# Patient Record
Sex: MALE | Race: WHITE
[De-identification: names, ages, dates, MRNs, and addresses within clinical notes are randomized per-mention and may not be internally consistent; named-entity substitution may affect disease eponyms.]

---

## 2020-02-14 ENCOUNTER — HOSPITAL ENCOUNTER (OUTPATIENT)
Dept: HOSPITAL 76 - LAB.WCP | Age: 85
Discharge: HOME | End: 2020-02-14
Attending: FAMILY MEDICINE
Payer: MEDICARE

## 2020-02-14 DIAGNOSIS — M10.00: Primary | ICD-10-CM

## 2020-02-14 LAB
ANION GAP SERPL CALCULATED.4IONS-SCNC: 9 MMOL/L (ref 6–13)
BASOPHILS NFR BLD AUTO: 0.1 10^3/UL (ref 0–0.1)
BASOPHILS NFR BLD AUTO: 0.6 %
BUN SERPL-MCNC: 24 MG/DL (ref 6–20)
CALCIUM UR-MCNC: 10 MG/DL (ref 8.5–10.3)
CHLORIDE SERPL-SCNC: 104 MMOL/L (ref 101–111)
CO2 SERPL-SCNC: 26 MMOL/L (ref 21–32)
CREAT SERPLBLD-SCNC: 1.3 MG/DL (ref 0.6–1.2)
CRP SERPL-MCNC: < 1 MG/DL (ref 0–1)
EOSINOPHIL # BLD AUTO: 0.4 10^3/UL (ref 0–0.7)
EOSINOPHIL NFR BLD AUTO: 4.8 %
ERYTHROCYTE [DISTWIDTH] IN BLOOD BY AUTOMATED COUNT: 14.4 % (ref 12–15)
GFRSERPLBLD MDRD-ARVRAT: 52 ML/MIN/{1.73_M2} (ref 89–?)
GLUCOSE SERPL-MCNC: 98 MG/DL (ref 70–100)
HGB UR QL STRIP: 16.1 G/DL (ref 14–18)
LYMPHOCYTES # SPEC AUTO: 1.2 10^3/UL (ref 1.5–3.5)
LYMPHOCYTES NFR BLD AUTO: 15.4 %
MCH RBC QN AUTO: 30.7 PG (ref 27–31)
MCHC RBC AUTO-ENTMCNC: 33 G/DL (ref 32–36)
MCV RBC AUTO: 93.1 FL (ref 80–94)
MONOCYTES # BLD AUTO: 0.8 10^3/UL (ref 0–1)
MONOCYTES NFR BLD AUTO: 10.9 %
NEUTROPHILS # BLD AUTO: 5.2 10^3/UL (ref 1.5–6.6)
NEUTROPHILS # SNV AUTO: 7.7 X10^3/UL (ref 4.8–10.8)
NEUTROPHILS NFR BLD AUTO: 67.8 %
PDW BLD AUTO: 10 FL (ref 7.4–11.4)
PLATELET # BLD: 324 10^3/UL (ref 130–450)
RBC MAR: 5.24 10^6/UL (ref 4.7–6.1)
SODIUM SERPLBLD-SCNC: 139 MMOL/L (ref 135–145)
URATE SERPL-MCNC: 8.2 MG/DL (ref 2.6–7.2)

## 2020-02-14 PROCEDURE — 86140 C-REACTIVE PROTEIN: CPT

## 2020-02-14 PROCEDURE — 84550 ASSAY OF BLOOD/URIC ACID: CPT

## 2020-02-14 PROCEDURE — 36415 COLL VENOUS BLD VENIPUNCTURE: CPT

## 2020-02-14 PROCEDURE — 85025 COMPLETE CBC W/AUTO DIFF WBC: CPT

## 2020-02-14 PROCEDURE — 85651 RBC SED RATE NONAUTOMATED: CPT

## 2020-02-14 PROCEDURE — 80048 BASIC METABOLIC PNL TOTAL CA: CPT

## 2020-03-17 ENCOUNTER — HOSPITAL ENCOUNTER (OUTPATIENT)
Dept: HOSPITAL 76 - EMS | Age: 85
Discharge: HOME | End: 2020-03-17
Attending: SURGERY
Payer: MEDICARE

## 2020-03-17 DIAGNOSIS — Z53.9: Primary | ICD-10-CM

## 2020-10-12 ENCOUNTER — HOSPITAL ENCOUNTER (OUTPATIENT)
Dept: HOSPITAL 76 - LAB.R | Age: 85
Discharge: HOME | End: 2020-10-12
Attending: PHYSICIAN ASSISTANT
Payer: MEDICARE

## 2020-10-12 DIAGNOSIS — R35.0: Primary | ICD-10-CM

## 2020-10-12 DIAGNOSIS — R41.0: ICD-10-CM

## 2020-10-12 LAB
CLARITY UR REFRACT.AUTO: CLEAR
GLUCOSE UR QL STRIP.AUTO: NEGATIVE MG/DL
KETONES UR QL STRIP.AUTO: NEGATIVE MG/DL
NITRITE UR QL STRIP.AUTO: NEGATIVE
PH UR STRIP.AUTO: 5 PH (ref 5–7.5)
PROT UR STRIP.AUTO-MCNC: NEGATIVE MG/DL
RBC # UR STRIP.AUTO: NEGATIVE /UL
SP GR UR STRIP.AUTO: 1.02 (ref 1–1.03)
SQUAMOUS URNS QL MICRO: (no result)
UROBILINOGEN UR QL STRIP.AUTO: (no result) E.U./DL
UROBILINOGEN UR STRIP.AUTO-MCNC: NEGATIVE MG/DL

## 2020-10-12 PROCEDURE — 81001 URINALYSIS AUTO W/SCOPE: CPT

## 2020-10-12 PROCEDURE — 87086 URINE CULTURE/COLONY COUNT: CPT

## 2020-11-23 ENCOUNTER — HOSPITAL ENCOUNTER (OUTPATIENT)
Dept: HOSPITAL 76 - DI | Age: 85
Discharge: HOME | End: 2020-11-23
Attending: FAMILY MEDICINE
Payer: MEDICARE

## 2020-11-23 DIAGNOSIS — I73.9: Primary | ICD-10-CM

## 2020-11-23 NOTE — ULTRASOUND REPORT
PROCEDURE:  Duplex Lwr Ext Arterial LT

 

INDICATIONS:  PERIPHERAL ARTERY DISEASE

 

TECHNIQUE:  

Color and pulse Doppler interrogation was performed of the right lower extremity arterial system, wit
h image documentation.  

 

COMPARISON:  None

 

FINDINGS:  

Common femoral artery:  101 cm/sec, with triphasic flow.  

Deep femoral artery:  71 cm/sec, with triphasic flow.  

Proximal superficial femoral artery:  103 cm/sec, with triphasic flow.  

Mid superficial femoral artery:  116 cm/sec, with triphasic flow.  

Distal superficial femoral artery:  55 cm/sec, with biphasic flow.  

Popliteal artery:  68 cm/sec, with triphasic flow.  

Posterior tibial artery:  109 cm/sec, with triphasic flow.  

Anterior tibial artery/dorsalis pedis:  69/40 cm/sec, with triphasic/triphasic flow.  

Gray-scale imaging description:  Mild calcific and soft plaque, but no significant stenosis. 

 

IMPRESSION:  

No sign of arterial insufficiency to the right lower extremity arterial vasculature.

 

Reviewed by: Ed Way MD on 11/23/2020 6:19 PM PST

Approved by: Ed Way MD on 11/23/2020 6:19 PM PST

 

 

Station ID:  [id number]

## 2021-05-26 ENCOUNTER — HOSPITAL ENCOUNTER (OUTPATIENT)
Dept: HOSPITAL 76 - LAB.N | Age: 86
Discharge: HOME | End: 2021-05-26
Attending: FAMILY MEDICINE
Payer: MEDICARE

## 2021-05-26 DIAGNOSIS — I10: Primary | ICD-10-CM

## 2021-05-26 LAB
ALBUMIN DIAFP-MCNC: 4.1 G/DL (ref 3.2–5.5)
ALBUMIN/GLOB SERPL: 1.4 {RATIO} (ref 1–2.2)
ALP SERPL-CCNC: 104 IU/L (ref 42–121)
ALT SERPL W P-5'-P-CCNC: 19 IU/L (ref 10–60)
ANION GAP SERPL CALCULATED.4IONS-SCNC: 7 MMOL/L (ref 6–13)
AST SERPL W P-5'-P-CCNC: 25 IU/L (ref 10–42)
BASOPHILS NFR BLD AUTO: 0.1 10^3/UL (ref 0–0.1)
BASOPHILS NFR BLD AUTO: 0.7 %
BILIRUB BLD-MCNC: 0.8 MG/DL (ref 0.2–1)
BUN SERPL-MCNC: 30 MG/DL (ref 6–20)
CALCIUM UR-MCNC: 9.6 MG/DL (ref 8.5–10.3)
CHLORIDE SERPL-SCNC: 106 MMOL/L (ref 101–111)
CHOLEST SERPL-MCNC: 142 MG/DL
CO2 SERPL-SCNC: 29 MMOL/L (ref 21–32)
CREAT SERPLBLD-SCNC: 1.2 MG/DL (ref 0.6–1.2)
EOSINOPHIL # BLD AUTO: 0.4 10^3/UL (ref 0–0.7)
EOSINOPHIL NFR BLD AUTO: 4.2 %
ERYTHROCYTE [DISTWIDTH] IN BLOOD BY AUTOMATED COUNT: 14.3 % (ref 12–15)
GFRSERPLBLD MDRD-ARVRAT: 57 ML/MIN/{1.73_M2} (ref 89–?)
GLOBULIN SER-MCNC: 3 G/DL (ref 2.1–4.2)
GLUCOSE SERPL-MCNC: 82 MG/DL (ref 70–100)
HCT VFR BLD AUTO: 46.9 % (ref 42–52)
HDLC SERPL-MCNC: 37 MG/DL
HDLC SERPL: 3.8 {RATIO} (ref ?–5)
HGB UR QL STRIP: 15.5 G/DL (ref 14–18)
LDLC SERPL CALC-MCNC: 77 MG/DL
LDLC/HDLC SERPL: 2.1 {RATIO} (ref ?–3.6)
LYMPHOCYTES # SPEC AUTO: 1.2 10^3/UL (ref 1.5–3.5)
LYMPHOCYTES NFR BLD AUTO: 12.3 %
MCH RBC QN AUTO: 32.1 PG (ref 27–31)
MCHC RBC AUTO-ENTMCNC: 33 G/DL (ref 32–36)
MCV RBC AUTO: 97.1 FL (ref 80–94)
MONOCYTES # BLD AUTO: 0.9 10^3/UL (ref 0–1)
MONOCYTES NFR BLD AUTO: 9.4 %
NEUTROPHILS # BLD AUTO: 7.1 10^3/UL (ref 1.5–6.6)
NEUTROPHILS # SNV AUTO: 9.7 X10^3/UL (ref 4.8–10.8)
NEUTROPHILS NFR BLD AUTO: 73 %
NRBC # BLD AUTO: 0 /100WBC
NRBC # BLD AUTO: 0 X10^3/UL
PDW BLD AUTO: 10.5 FL (ref 7.4–11.4)
PLATELET # BLD: 314 10^3/UL (ref 130–450)
POTASSIUM SERPL-SCNC: 4.1 MMOL/L (ref 3.5–5)
PROT SPEC-MCNC: 7.1 G/DL (ref 6.7–8.2)
RBC MAR: 4.83 10^6/UL (ref 4.7–6.1)
SODIUM SERPLBLD-SCNC: 142 MMOL/L (ref 135–145)
TRIGL P FAST SERPL-MCNC: 139 MG/DL
VLDLC SERPL-SCNC: 28 MG/DL

## 2021-05-26 PROCEDURE — 80053 COMPREHEN METABOLIC PANEL: CPT

## 2021-05-26 PROCEDURE — 83721 ASSAY OF BLOOD LIPOPROTEIN: CPT

## 2021-05-26 PROCEDURE — 36415 COLL VENOUS BLD VENIPUNCTURE: CPT

## 2021-05-26 PROCEDURE — 85025 COMPLETE CBC W/AUTO DIFF WBC: CPT

## 2021-05-26 PROCEDURE — 80061 LIPID PANEL: CPT

## 2021-07-12 ENCOUNTER — HOSPITAL ENCOUNTER (EMERGENCY)
Dept: HOSPITAL 76 - ED | Age: 86
Discharge: HOME | End: 2021-07-12
Payer: MEDICARE

## 2021-07-12 ENCOUNTER — HOSPITAL ENCOUNTER (OUTPATIENT)
Dept: HOSPITAL 76 - EMS | Age: 86
Discharge: ANOTHER HOSPITAL | End: 2021-07-12
Payer: MEDICARE

## 2021-07-12 ENCOUNTER — HOSPITAL ENCOUNTER (OUTPATIENT)
Dept: HOSPITAL 76 - EMS | Age: 86
Discharge: HOME | End: 2021-07-12
Attending: EMERGENCY MEDICINE
Payer: MEDICARE

## 2021-07-12 VITALS — DIASTOLIC BLOOD PRESSURE: 77 MMHG | SYSTOLIC BLOOD PRESSURE: 143 MMHG

## 2021-07-12 DIAGNOSIS — S01.01XA: ICD-10-CM
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
## 2021-07-12 DIAGNOSIS — S06.6X0A: Primary | ICD-10-CM

## 2021-07-12 DIAGNOSIS — S06.6X9A: Primary | ICD-10-CM

## 2021-07-12 DIAGNOSIS — Z20.822: ICD-10-CM

## 2021-07-12 DIAGNOSIS — Z66: ICD-10-CM

## 2021-07-12 DIAGNOSIS — F03.90: ICD-10-CM
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
## 2021-07-12 DIAGNOSIS — Z23: ICD-10-CM
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
## 2021-07-12 DIAGNOSIS — I50.9: ICD-10-CM

## 2021-07-12 DIAGNOSIS — S01.01XA: Primary | ICD-10-CM
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
## 2021-07-12 DIAGNOSIS — Z91.81: ICD-10-CM

## 2021-07-12 DIAGNOSIS — I11.0: ICD-10-CM

## 2021-07-12 LAB
ALBUMIN DIAFP-MCNC: 4.3 G/DL (ref 3.2–5.5)
ALBUMIN/GLOB SERPL: 1.5 {RATIO} (ref 1–2.2)
ALP SERPL-CCNC: 103 IU/L (ref 42–121)
ALT SERPL W P-5'-P-CCNC: 24 IU/L (ref 10–60)
ANION GAP SERPL CALCULATED.4IONS-SCNC: 14 MMOL/L (ref 6–13)
AST SERPL W P-5'-P-CCNC: 34 IU/L (ref 10–42)
B PARAPERT DNA SPEC QL NAA+PROBE: NOT DETECTED
B PERT DNA SPEC QL NAA+PROBE: NOT DETECTED
BASOPHILS NFR BLD AUTO: 0.1 10^3/UL (ref 0–0.1)
BASOPHILS NFR BLD AUTO: 0.5 %
BILIRUB BLD-MCNC: 3.4 MG/DL (ref 0.2–1)
BILIRUB UR QL CFM: NEGATIVE
BUN SERPL-MCNC: 24 MG/DL (ref 6–20)
C PNEUM DNA NPH QL NAA+NON-PROBE: NOT DETECTED
CALCIUM UR-MCNC: 10.3 MG/DL (ref 8.5–10.3)
CHLORIDE SERPL-SCNC: 104 MMOL/L (ref 101–111)
CLARITY UR REFRACT.AUTO: CLEAR
CO2 SERPL-SCNC: 22 MMOL/L (ref 21–32)
CREAT SERPLBLD-SCNC: 1 MG/DL (ref 0.6–1.2)
EOSINOPHIL # BLD AUTO: 0.1 10^3/UL (ref 0–0.7)
EOSINOPHIL NFR BLD AUTO: 1.1 %
ERYTHROCYTE [DISTWIDTH] IN BLOOD BY AUTOMATED COUNT: 14.4 % (ref 12–15)
FLUAV RNA RESP QL NAA+PROBE: NOT DETECTED
GFRSERPLBLD MDRD-ARVRAT: 71 ML/MIN/{1.73_M2} (ref 89–?)
GLOBULIN SER-MCNC: 2.9 G/DL (ref 2.1–4.2)
GLUCOSE SERPL-MCNC: 100 MG/DL (ref 70–100)
GLUCOSE UR QL STRIP.AUTO: NEGATIVE MG/DL
HAEM INFLU B DNA SPEC QL NAA+PROBE: NOT DETECTED
HCOV 229E RNA SPEC QL NAA+PROBE: NOT DETECTED
HCOV HKU1 RNA UPPER RESP QL NAA+PROBE: NOT DETECTED
HCOV NL63 RNA ASPIRATE QL NAA+PROBE: NOT DETECTED
HCOV OC43 RNA SPEC QL NAA+PROBE: NOT DETECTED
HCT VFR BLD AUTO: 48.5 % (ref 42–52)
HGB UR QL STRIP: 17.1 G/DL (ref 14–18)
HMPV AG SPEC QL: NOT DETECTED
HPIV1 RNA NPH QL NAA+PROBE: NOT DETECTED
HPIV2 SPEC QL CULT: NOT DETECTED
HPIV3 AB TITR SER CF: NOT DETECTED {TITER}
HPIV4 RNA SPEC QL NAA+PROBE: NOT DETECTED
KETONES UR QL STRIP.AUTO: 15 MG/DL
LIPASE SERPL-CCNC: 22 U/L (ref 22–51)
LYMPHOCYTES # SPEC AUTO: 0.7 10^3/UL (ref 1.5–3.5)
LYMPHOCYTES NFR BLD AUTO: 7.6 %
M PNEUMO DNA SPEC QL NAA+PROBE: NOT DETECTED
MCH RBC QN AUTO: 32.5 PG (ref 27–31)
MCHC RBC AUTO-ENTMCNC: 35.3 G/DL (ref 32–36)
MCV RBC AUTO: 92.2 FL (ref 80–94)
MONOCYTES # BLD AUTO: 0.8 10^3/UL (ref 0–1)
MONOCYTES NFR BLD AUTO: 8.8 %
NEUTROPHILS # BLD AUTO: 7.8 10^3/UL (ref 1.5–6.6)
NEUTROPHILS # SNV AUTO: 9.5 X10^3/UL (ref 4.8–10.8)
NEUTROPHILS NFR BLD AUTO: 81.7 %
NITRITE UR QL STRIP.AUTO: NEGATIVE
NRBC # BLD AUTO: 0 /100WBC
NRBC # BLD AUTO: 0 X10^3/UL
PDW BLD AUTO: 9.4 FL (ref 7.4–11.4)
PH UR STRIP.AUTO: 6 PH (ref 5–7.5)
PLATELET # BLD: 290 10^3/UL (ref 130–450)
POTASSIUM SERPL-SCNC: 3.4 MMOL/L (ref 3.5–5)
PROT SPEC-MCNC: 7.2 G/DL (ref 6.7–8.2)
PROT UR STRIP.AUTO-MCNC: (no result) MG/DL
RBC # UR STRIP.AUTO: NEGATIVE /UL
RBC MAR: 5.26 10^6/UL (ref 4.7–6.1)
RSV RNA RESP QL NAA+PROBE: NOT DETECTED
RV+EV RNA SPEC QL NAA+PROBE: NOT DETECTED
SARS-COV-2 RNA PNL SPEC NAA+PROBE: NOT DETECTED
SODIUM SERPLBLD-SCNC: 140 MMOL/L (ref 135–145)
SP GR UR STRIP.AUTO: 1.02 (ref 1–1.03)
UROBILINOGEN UR QL STRIP.AUTO: 4 E.U./DL
UROBILINOGEN UR STRIP.AUTO-MCNC: NEGATIVE MG/DL

## 2021-07-12 PROCEDURE — 12032 INTMD RPR S/A/T/EXT 2.6-7.5: CPT

## 2021-07-12 PROCEDURE — 0202U NFCT DS 22 TRGT SARS-COV-2: CPT

## 2021-07-12 PROCEDURE — 99284 EMERGENCY DEPT VISIT MOD MDM: CPT

## 2021-07-12 PROCEDURE — 81001 URINALYSIS AUTO W/SCOPE: CPT

## 2021-07-12 PROCEDURE — 87086 URINE CULTURE/COLONY COUNT: CPT

## 2021-07-12 PROCEDURE — 72125 CT NECK SPINE W/O DYE: CPT

## 2021-07-12 PROCEDURE — 83690 ASSAY OF LIPASE: CPT

## 2021-07-12 PROCEDURE — 70450 CT HEAD/BRAIN W/O DYE: CPT

## 2021-07-12 PROCEDURE — 99281 EMR DPT VST MAYX REQ PHY/QHP: CPT

## 2021-07-12 PROCEDURE — 90471 IMMUNIZATION ADMIN: CPT

## 2021-07-12 PROCEDURE — 81003 URINALYSIS AUTO W/O SCOPE: CPT

## 2021-07-12 PROCEDURE — 96372 THER/PROPH/DIAG INJ SC/IM: CPT

## 2021-07-12 PROCEDURE — 80053 COMPREHEN METABOLIC PANEL: CPT

## 2021-07-12 PROCEDURE — 85025 COMPLETE CBC W/AUTO DIFF WBC: CPT

## 2021-07-12 PROCEDURE — 87631 RESP VIRUS 3-5 TARGETS: CPT

## 2021-07-12 PROCEDURE — 90715 TDAP VACCINE 7 YRS/> IM: CPT

## 2021-07-12 PROCEDURE — 36415 COLL VENOUS BLD VENIPUNCTURE: CPT

## 2021-07-12 RX ADMIN — TETANUS TOXOID, REDUCED DIPHTHERIA TOXOID AND ACELLULAR PERTUSSIS VACCINE, ADSORBED ONE ML: 5; 2.5; 8; 8; 2.5 SUSPENSION INTRAMUSCULAR at 06:44

## 2021-07-12 RX ADMIN — BACITRACIN STA PACKET: 500 OINTMENT TOPICAL at 06:56

## 2021-07-12 RX ADMIN — Medication STA ML: at 06:11

## 2021-07-12 RX ADMIN — DIAZEPAM STA MG: 5 INJECTION, SOLUTION INTRAMUSCULAR; INTRAVENOUS at 10:13

## 2021-07-12 NOTE — CT REPORT
PROCEDURE:  HEAD WO

 

INDICATIONS:  Subarachnoid hemorrhage

 

TECHNIQUE:  

Noncontrast 4.5 mm thick angled axial sections acquired from the foramen magnum to the vertex.  For r
adiation dose reduction, the following was used:  automated exposure control, adjustment of mA and/or
 kV according to patient size.

 

COMPARISON:  Head CT 7/12/2021

 

FINDINGS:  

Image quality:  Excellent.  

 

CSF spaces:  Basal cisterns are patent.  No extra-axial fluid collections.  Ventricles are normal in 
size and shape.  

 

Brain: Previously seen multifocal subarachnoid hemorrhage is redemonstrated. The foci of subarachnoid
 hemorrhage immediately adjacent to the interhemispheric fissure have slightly increased. There other
 foci of hemorrhage are stable. No additional intracranial hemorrhage identified. No findings of mass
 effect or midline shift. Skull and face:  Calvarium and visualized facial bones are intact, without 
suspicious lesions.  

 

Sinuses:  Visualized sinuses and mastoids are clear.  

 

IMPRESSION:  Slight increase in size of one of the foci of subarachnoid hemorrhage seen previously. R
emaining foci of subarachnoid hemorrhage are stable.

 

Reviewed by: Armando Lyles MD on 7/12/2021 4:46 PM PDT

Approved by: Armando Lyles MD on 7/12/2021 4:46 PM PDT

 

 

Station ID:  [id number]

## 2021-07-12 NOTE — CT REPORT
PROCEDURE:  CERVICAL SPINE WO

 

INDICATIONS:  Trauma, fall, head injury

 

TECHNIQUE:  

Noncontrast 3 mm thick sections acquired from the skull base to the T4 level.  Sagittal and coronal r
eformats were then constructed.  For radiation dose reduction, the following was used:  automated exp
osure control, adjustment of mA and/or kV according to patient size.

 

COMPARISON:  None.

 

FINDINGS:  

Image quality:  Excellent.  

 

Bones:  No fractures or dislocations.  Visualized superior ribs are intact.  

 

Soft tissues:  Prevertebral soft tissues are normal in thickness.  No paravertebral hematomas.  No ap
ical pneumothoraces.  

 

IMPRESSION:  

No CT evidence of acute traumatic cervical spine injury.

 

Reviewed by: Armando Lyles MD on 7/12/2021 7:49 AM PDT

Approved by: Armando Lyles MD on 7/12/2021 7:49 AM PDT

 

 

Station ID:  [id number]

## 2021-07-12 NOTE — ED PHYSICIAN DOCUMENTATION
PD HPI HEAD INJURY





- Stated complaint


Stated Complaint: GLF, HEAD INJURY





- Chief complaint


Chief Complaint: Laceration





- History obtained from


History obtained from: Patient, EMS





- History of Present Illness


Mechanism of head injury: Fell


Where head injury occurred: Home


Timing - onset: Today


Location of injury: Back


Quality of pain: Pain


Associated symptoms: Other.  No: LOC


Symptoms improve with: Rest


Symptoms worsen with: Palpation


Contributing factors: No: Anticoagulated


Similar symptoms before: Has not had sx before


Recently seen: Not recently seen





- Additional information


Additional information: 





88-year-old male resident of [place] with a history of advanced dementia was 

found at the nurses station to have a laceration to the back of his head.  He 

was unable to describe how he fell and the fall was unwitnessed.  He is not able

as a historian to provide any detail.





Review of Systems


Unable to obtain: Dementia





PD PAST MEDICAL HISTORY





- Past Medical History


Cardiovascular: Congestive heart failure, Hypertension


Neuro: Dementia


: Benign prostate hypertrophy


Musculoskeletal: Osteoarthritis, Gout, Other (Dupuytren's contracture right 

hand)


Derm: Other





- Past Surgical History


HEENT: Other





- Present Medications


Home Medications: 


                                Ambulatory Orders











 Medication  Instructions  Recorded  Confirmed


 


Amlodipine Besylate 10 mg PO DAILY 01/22/20 07/12/21


 


Furosemide [Lasix] 60 mg PO DAILY 01/22/20 07/12/21


 


Lisinopril [Zestril] 40 mg PO DAILY 01/22/20 07/12/21


 


Potassium Chloride 20 meq PO DAILY 01/22/20 07/12/21


 


allopurinoL [Zyloprim] 1 tab PO DAILY 12/15/20 07/12/21


 


Acetaminophen [Acetaminophen Extra 1 tab PO TID PRN 07/12/21 07/12/21





Strength]   


 


Omega-3/Dha/Epa/Fish Oil [Fish Oil 1 cap PO DAILY 07/12/21 07/12/21





1,000 mg Softgel]   














- Allergies


Allergies/Adverse Reactions: 


                                    Allergies











Allergy/AdvReac Type Severity Reaction Status Date / Time


 


Sulfa (Sulfonamide Allergy  Unknown Verified 07/12/21 05:56





Antibiotics)     














- Social History


Smoking Status: Never smoker





PD ED PE NORMAL





- Vitals


Vital signs reviewed: Yes (Hypertensive mild)





- General


General: No acute distress, Well developed/nourished





- HEENT


HEENT: PERRL, EOMI, Other (There is a 4 cm L-shaped laceration to the occiput.  

There is no crepitance to the underlying tissues.)





- Neck


Neck: Supple, no meningeal sign, No bony TTP





- Cardiac


Cardiac: RRR, No murmur





- Respiratory


Respiratory: No respiratory distress, Clear bilaterally





- Abdomen


Abdomen: Normal bowel sounds, Soft, Non tender, Non distended, No organomegaly





- Back


Back: No CVA TTP, No spinal TTP





- Derm


Derm: Normal color, Warm and dry, No rash





- Extremities


Extremities: Other (Both feet are swollen this appears chronic.)





- Neuro


Neuro: CNs 2-12 intact, No motor deficit, No sensory deficit, Normal speech


Eye Opening: Spontaneous


Motor: Obeys Commands


Verbal: Confused


GCS Score: 14





- Psych


Psych: Normal mood, Normal affect





Results





- Vitals


Vitals: 


                               Vital Signs - 24 hr











  07/12/21





  05:40


 


Temperature 36.6 C


 


Heart Rate 73


 


Respiratory 18





Rate 


 


Blood Pressure 145/80 H


 


O2 Saturation 100








                                     Oxygen











O2 Source                      Room air

















Procedures





- Laceration (location)


  ** occiput


Length in cm: 4


Wound type: Curved, Flap, Into muscle


Neurovascular status: Sensory intact, Motor intact, Vascular intact


Anesthesia: Lidocaine 1%, With bicarb


Wound preparation: Hibiclens, Irrigated copiously NS, Wound explored, To the 

base, Multiple flaps aligned


Skin layer closure: Staples


Other: Patient tolerated well, No complications, Neurovascular intact, Dressing 

applied, Tetanus booster given





PD MEDICAL DECISION MAKING





- ED course


Complexity details: reviewed old records, re-evaluated patient, considered 

differential, d/w patient, d/w family


ED course: 





88-year-old male with advanced dementia is unable to provide any significant 

history associated with this except that he did have a fall and has a laceration

to his scalp.  He denies pain in his neck on examination he has a 4 cm scalp 

laceration without involvement of deeper structures.  This is cleansed 

anesthetized cleansed further and closed with staples. The patient tolerates 

this well. The patient is unable to give us any specific history of his injury 

and his daughter eventually shows up to the emergency department and indicates 

that the patient appears off from his usual baseline.  She indicates that he is 

more agitated than usual and that he has been having a problem with his balance 

for the past 2 weeks and had a fall last Thursday.





The patient is "un" discharged and further evaluation is ordered.  At shift 

change his care is turned over to Dr. Nicholas for further work-up and 

disposition.





Departure





- Departure


Clinical Impression: 


Occipital scalp laceration


Qualifiers:


 Encounter type: initial encounter Qualified Code(s): S01.01XA - Laceration 

without foreign body of scalp, initial encounter





Condition: Stable


Instructions:  ED Laceration Scalp Stitch Or Stap


Follow-Up: 


Corky Argueta DO [Provider Admit Priv/Credential] - 


Comments: 


Staples should be removed in 10 days

## 2021-07-12 NOTE — CT REPORT
PROCEDURE:  HEAD WO

 

INDICATIONS:  Trauma, fall, head injury. 

 

TECHNIQUE:  

Noncontrast 4.5 mm thick angled axial sections acquired from the foramen magnum to the vertex.  For r
adiation dose reduction, the following was used:  automated exposure control, adjustment of mA and/or
 kV according to patient size.

 

COMPARISON:  None.

 

FINDINGS:  

Image quality:  Excellent.  

 

CSF spaces:  Basal cisterns are patent.  No extra-axial fluid collections.  Ventricles are normal in 
size and shape.  

 

Brain: There are multiple small foci of subarachnoid hemorrhage for example on series 4 image 24 in t
he left posterior parietal lobe, also on series 4 image 23 in the left posterior parietal lobe. Hyper
density adjacent to the left falx on series 4 image 19 is also likely subarachnoid although there cou
ld be a tiny subdural component as well. Tiny foci of subarachnoid hemorrhage on series 4 image 18 an
d series 4 image 19 also noted. Global cerebral volume loss and advanced chronic microvascular ischem
ic changes. Skull and face:  

 

Calvarium and visualized facial bones are intact, without suspicious lesions.  Left posterior scalp l
aceration with skin staples.

 

Sinuses:  Visualized sinuses and mastoids are clear.  

 

IMPRESSION: Several small foci of subarachnoid hemorrhage in the left parietal lobe, consistent with 
traumatic subarachnoid hemorrhage. Findings were discussed with Dr. Nicholas at 0753 on 7/12/2021. 

 

Reviewed by: Armando Lyles MD on 7/12/2021 7:56 AM PDT

Approved by: Armando Lyles MD on 7/12/2021 7:56 AM PDT

 

 

Station ID:  [id number]

## 2021-07-13 ENCOUNTER — HOSPITAL ENCOUNTER (OUTPATIENT)
Dept: HOSPITAL 76 - EMS | Age: 86
Discharge: ANOTHER HOSPITAL | End: 2021-07-13
Payer: MEDICARE

## 2021-07-13 ENCOUNTER — HOSPITAL ENCOUNTER (EMERGENCY)
Dept: HOSPITAL 76 - ED | Age: 86
Discharge: HOME | End: 2021-07-13
Payer: MEDICARE

## 2021-07-13 VITALS — SYSTOLIC BLOOD PRESSURE: 118 MMHG | DIASTOLIC BLOOD PRESSURE: 66 MMHG

## 2021-07-13 DIAGNOSIS — S01.01XA: ICD-10-CM

## 2021-07-13 DIAGNOSIS — R45.1: ICD-10-CM

## 2021-07-13 DIAGNOSIS — Z66: ICD-10-CM
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
## 2021-07-13 DIAGNOSIS — F03.90: ICD-10-CM
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
## 2021-07-13 DIAGNOSIS — I11.0: ICD-10-CM

## 2021-07-13 DIAGNOSIS — R41.0: Primary | ICD-10-CM

## 2021-07-13 DIAGNOSIS — S06.6X0A: Primary | ICD-10-CM

## 2021-07-13 DIAGNOSIS — I50.9: ICD-10-CM

## 2021-07-13 PROCEDURE — 70450 CT HEAD/BRAIN W/O DYE: CPT

## 2021-07-13 PROCEDURE — 99284 EMERGENCY DEPT VISIT MOD MDM: CPT

## 2021-07-13 PROCEDURE — 99283 EMERGENCY DEPT VISIT LOW MDM: CPT

## 2021-07-13 RX ADMIN — FUROSEMIDE STA MG: 20 TABLET ORAL at 09:10

## 2021-07-13 RX ADMIN — METOPROLOL TARTRATE STA: 50 TABLET, FILM COATED ORAL at 05:40

## 2021-07-13 NOTE — CT REPORT
PROCEDURE:  HEAD WO

 

INDICATIONS:  Trauma, fall, altered mental status

 

TECHNIQUE:  

Noncontrast 4.5 mm thick angled axial sections acquired from the foramen magnum to the vertex.  For r
adiation dose reduction, the following was used:  automated exposure control, adjustment of mA and/or
 kV according to patient size.

 

COMPARISON:  7/12/2021 and CT examinations

 

FINDINGS:  

Image quality:  Excellent.  

 

CSF spaces: Basilar cisterns and ventricular system are patent with no significant change in caliber 
from prior study.

 

Brain: Unchanged left parietal cerebral convexity subarachnoid hemorrhage. Remaining findings are unc
hanged

 

Skull and face:  Calvarium and visualized facial bones are intact, without suspicious lesions.  Scalp
 staples in the posterior parieto-occipital region are similar.

 

Sinuses:  Visualized sinuses and mastoids are clear.  

 

IMPRESSION:  Multifocal left parietal convexity subarachnoid hemorrhage is unchanged from most recent
 prior study.

 

Reviewed by: Armnado Lyles MD on 7/13/2021 7:01 AM PDT

Approved by: Armando Lyles MD on 7/13/2021 7:01 AM PDT

 

 

Station ID:  [id number]

## 2021-07-13 NOTE — ED PHYSICIAN DOCUMENTATION
History of Present Illness





- Stated complaint


Stated Complaint: GLF/ CONFUSED





- Chief complaint


Chief Complaint: General





- History obtained from


History obtained from: Family (daughter), EMS, Caregiver, Other (patient unable 

to contribute to HPI/ROS due to confusion)





- Additonal information


Additional information: 





[facility]OR[facility] from [facility] assisted living facility. He was brought in by ambulance 

yesterday morning (7/12/21) after fall, had scalp laceration that was repaired 

(stapled) by EDMD. Preparations were being made for d/c back to [facility] when

daughter arrived to ED and told EDMD that patient's mentation was worse than 

baseline. Because of this, patient then had CT head and neck ordered and case 

signed out to the oncoming (day) EDMD. CTH showed several small foci of SAH in 

left parietal lobe. Patient was held in ED repeat CTH approximately 9 hours 

later showed slight increase in size of one of the foci of SAH with remaining 

foci stable. 


He is now brought back in to ED by ambulance after being found on the floor next

to his bed. There was not a witnessed fall. EMS says that [facility] staff note

patient remains more confused than his baseline 





Review of Systems


Unable to obtain: Confused, Dementia





PD PAST MEDICAL HISTORY





- Past Medical History


Past Medical History: Yes


Cardiovascular: Congestive heart failure, Hypertension


Neuro: Dementia


: Benign prostate hypertrophy


Musculoskeletal: Osteoarthritis, Gout, Other


Derm: Other





- Past Surgical History


Past Surgical History: Yes


HEENT: Other





- Present Medications


Home Medications: 


                                Ambulatory Orders











 Medication  Instructions  Recorded  Confirmed


 


Amlodipine Besylate 10 mg PO DAILY 01/22/20 07/12/21


 


Furosemide [Lasix] 60 mg PO DAILY 01/22/20 07/12/21


 


Lisinopril [Zestril] 40 mg PO DAILY 01/22/20 07/12/21


 


Potassium Chloride 20 meq PO DAILY 01/22/20 07/12/21


 


allopurinoL [Zyloprim] 1 tab PO DAILY 12/15/20 07/12/21


 


Acetaminophen [Acetaminophen Extra 1 tab PO TID PRN 07/12/21 07/12/21





Strength]   


 


Omega-3/Dha/Epa/Fish Oil [Fish Oil 1 cap PO DAILY 07/12/21 07/12/21





1,000 mg Softgel]   














- Allergies


Allergies/Adverse Reactions: 


                                    Allergies











Allergy/AdvReac Type Severity Reaction Status Date / Time


 


Sulfa (Sulfonamide Allergy  Unknown Verified 07/12/21 05:56





Antibiotics)     














- Social History


Does the pt smoke?: No


Smoking Status: Never smoker





- Immunizations


Immunizations are current?: No


Immunizations: TDAP >10years/unknown





- POLST


Patient has POLST: No





PD ED PE NORMAL





- Vitals


Vital signs reviewed: Yes





- General


General: No acute distress, Well developed/nourished, Other (awake, alert but 

confused. does not provide appropriate answers to questions. follows simple 

commands, NAD)





- HEENT


HEENT: PERRL, EOMI, Moist mucous membranes





- Neck


Neck: No bony TTP





- Cardiac


Cardiac: RRR, No murmur





- Respiratory


Respiratory: No respiratory distress, Clear bilaterally





- Abdomen


Abdomen: Soft, Non tender





- Back


Back: No spinal TTP





- Derm


Derm: Normal color, Warm and dry





- Extremities


Extremities: No tenderness to palpate, Normal ROM s pain





PD ED PE EXPANDED





- HEENT


HEENT: Other (posterior occipital scalp laceration closed with staples in place)





Results





- Vitals


Vitals: 


                               Vital Signs - 24 hr











  07/13/21 07/13/21 07/13/21





  03:00 07:42 11:43


 


Temperature 36.6 C  34.4 C L


 


Heart Rate 75 66 99


 


Respiratory 16 16 16





Rate   


 


Blood Pressure 133/80 H 130/70 157/79 H


 


O2 Saturation 95 96 96














  07/13/21





  13:04


 


Temperature 36.7 C


 


Heart Rate 68


 


Respiratory 20





Rate 


 


Blood Pressure 118/66


 


O2 Saturation 95








                                     Oxygen











O2 Source                      Room air

















- Rads (name of study)


  ** [facility]


Radiology: Prelim report reviewed, See rad report





PD MEDICAL DECISION MAKING





- ED course


Complexity details: reviewed old records, reviewed results, re-evaluated 

patient, considered differential, d/w patient, d/w family


ED course: 





unremarkable blood tests and UA on yesterday's visit and thus these were not 

repeated tonight. [facility] tonight demonstrates no significant change from previous. 

Case signed out to oncoming ED physician pending disposition





Departure





- Departure


Disposition: 01 Home, Self Care


Clinical Impression: 


 Occipital scalp laceration, Subarachnoid hem w/o coma, Confusion





Condition: Stable


Instructions:  ED Laceration Scalp Stitch Or Stap


Follow-Up: 


Corky Argueta DO [Primary Care Provider] - 


Comments: 


It is okay to wash and shower. Clean off the wound twice a day with soap and 

water, or peroxide and water. Apply some antibiotic ointment to it to keep it 

moist. Also to watch for signs of infection such as purulence, redness or 

increasing pain. Return to your primary care or the ER at the specified time for

suture removal.





Staple removal in 8 to 10 days.





Tylenol every 4-6 hours if needed for headache or pains.  Otherwise continue 

usual medications and maintain good hydration and intake.





Social work informs me that you will be trying to get a higher level of assisted

care in the short-term at [facility] and ultimately looking for a higher level 

of care placement.





The CT scan did not show any worsening of the bleeding from yesterday.  This 

should slowly resorb like a bruise over several days to week.  At this point 

there would not be any anticipation of worsening bleeding.  Return to the ER 

however if significant change in mentation.


Discharge Date/Time: 07/13/21 13:07

## 2021-07-13 NOTE — ED PHYSICIAN DOCUMENTATION
ED Addendum





- Addendum


Addendum: 





07/13/21 12:35The patient has had reportedly some general decline over the last 

few weeks with some falling and weakness.  Seen in the ER yesterday after a fall

on had a small subarachnoid bleed that was stable over 10 hours with a repeat CT

scan.  He returned to [place] was noted to be still having weakness and 

confusion and return to the ER by ambulance this morning.  His daughter is here 

and states he is a little off his baseline on mentation and does seem a little 

anxious.





Care was handed off to me on change of shift.  The patient ate breakfast and was

interacting with his daughter.  She states he is doing a little bit better now. 

Social work talked with them as well as [facility].  Ultimate goal is higher 

level of care in the near term but the short-term today is the patient's 

daughter is comfortable having him back to [place] and will get back some 

caregivers he used to have to have more continued presence.  Social work talked 

with [facility] and they are able to provide a higher level of service as well 

in the short-term.  The daughter is looking into skilled nursing facilities.





At this point there seems to be a good level of comfort with him returning to 

[place] with a higher level of assistance and a dose.  The CT scan again 

today showed no interval change from yesterday.

## 2021-07-14 ENCOUNTER — HOSPITAL ENCOUNTER (EMERGENCY)
Dept: HOSPITAL 76 - ED | Age: 86
Discharge: HOME | End: 2021-07-14
Payer: MEDICARE

## 2021-07-14 VITALS — DIASTOLIC BLOOD PRESSURE: 82 MMHG | SYSTOLIC BLOOD PRESSURE: 126 MMHG

## 2021-07-14 DIAGNOSIS — Z66: ICD-10-CM

## 2021-07-14 DIAGNOSIS — S06.6X0A: Primary | ICD-10-CM

## 2021-07-14 DIAGNOSIS — I11.0: ICD-10-CM

## 2021-07-14 DIAGNOSIS — I50.9: ICD-10-CM

## 2021-07-14 DIAGNOSIS — R53.1: ICD-10-CM

## 2021-07-14 DIAGNOSIS — S01.01XA: ICD-10-CM

## 2021-07-14 DIAGNOSIS — M54.2: ICD-10-CM
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
## 2021-07-14 DIAGNOSIS — F03.90: ICD-10-CM

## 2021-07-14 DIAGNOSIS — R41.82: ICD-10-CM

## 2021-07-14 DIAGNOSIS — R26.2: ICD-10-CM
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
## 2021-07-14 LAB
ALBUMIN DIAFP-MCNC: 4.3 G/DL (ref 3.2–5.5)
ALBUMIN/GLOB SERPL: 1.6 {RATIO} (ref 1–2.2)
ALP SERPL-CCNC: 99 IU/L (ref 42–121)
ALT SERPL W P-5'-P-CCNC: 32 IU/L (ref 10–60)
ANION GAP SERPL CALCULATED.4IONS-SCNC: 11 MMOL/L (ref 6–13)
AST SERPL W P-5'-P-CCNC: 37 IU/L (ref 10–42)
BASOPHILS NFR BLD AUTO: 0.1 10^3/UL (ref 0–0.1)
BASOPHILS NFR BLD AUTO: 0.6 %
BILIRUB BLD-MCNC: 2.1 MG/DL (ref 0.2–1)
BUN SERPL-MCNC: 24 MG/DL (ref 6–20)
CALCIUM UR-MCNC: 10.1 MG/DL (ref 8.5–10.3)
CHLORIDE SERPL-SCNC: 102 MMOL/L (ref 101–111)
CO2 SERPL-SCNC: 27 MMOL/L (ref 21–32)
CREAT SERPLBLD-SCNC: 1.1 MG/DL (ref 0.6–1.2)
EOSINOPHIL # BLD AUTO: 0.4 10^3/UL (ref 0–0.7)
EOSINOPHIL NFR BLD AUTO: 4.2 %
ERYTHROCYTE [DISTWIDTH] IN BLOOD BY AUTOMATED COUNT: 14.6 % (ref 12–15)
GFRSERPLBLD MDRD-ARVRAT: 63 ML/MIN/{1.73_M2} (ref 89–?)
GLOBULIN SER-MCNC: 2.7 G/DL (ref 2.1–4.2)
GLUCOSE SERPL-MCNC: 104 MG/DL (ref 70–100)
HCT VFR BLD AUTO: 49.9 % (ref 42–52)
HGB UR QL STRIP: 17.5 G/DL (ref 14–18)
LIPASE SERPL-CCNC: 27 U/L (ref 22–51)
LYMPHOCYTES # SPEC AUTO: 1.3 10^3/UL (ref 1.5–3.5)
LYMPHOCYTES NFR BLD AUTO: 14.1 %
MCH RBC QN AUTO: 32.8 PG (ref 27–31)
MCHC RBC AUTO-ENTMCNC: 35.1 G/DL (ref 32–36)
MCV RBC AUTO: 93.6 FL (ref 80–94)
MONOCYTES # BLD AUTO: 1.1 10^3/UL (ref 0–1)
MONOCYTES NFR BLD AUTO: 11.7 %
NEUTROPHILS # BLD AUTO: 6.2 10^3/UL (ref 1.5–6.6)
NEUTROPHILS # SNV AUTO: 9 X10^3/UL (ref 4.8–10.8)
NEUTROPHILS NFR BLD AUTO: 69 %
NRBC # BLD AUTO: 0 /100WBC
NRBC # BLD AUTO: 0 X10^3/UL
PDW BLD AUTO: 9.3 FL (ref 7.4–11.4)
PLATELET # BLD: 292 10^3/UL (ref 130–450)
POTASSIUM SERPL-SCNC: 3.8 MMOL/L (ref 3.5–5)
PROT SPEC-MCNC: 7 G/DL (ref 6.7–8.2)
RBC MAR: 5.33 10^6/UL (ref 4.7–6.1)
SODIUM SERPLBLD-SCNC: 140 MMOL/L (ref 135–145)

## 2021-07-14 PROCEDURE — 80053 COMPREHEN METABOLIC PANEL: CPT

## 2021-07-14 PROCEDURE — 85025 COMPLETE CBC W/AUTO DIFF WBC: CPT

## 2021-07-14 PROCEDURE — 99284 EMERGENCY DEPT VISIT MOD MDM: CPT

## 2021-07-14 PROCEDURE — 36415 COLL VENOUS BLD VENIPUNCTURE: CPT

## 2021-07-14 PROCEDURE — 99281 EMR DPT VST MAYX REQ PHY/QHP: CPT

## 2021-07-14 PROCEDURE — 83690 ASSAY OF LIPASE: CPT

## 2021-07-14 NOTE — ED PHYSICIAN DOCUMENTATION
History of Present Illness





- Stated complaint


Stated Complaint: HEAD INJ





- Chief complaint


Chief Complaint: Neuro





- Additonal information


Additional information: 





And has a hip88-year-old male was advised by his primary care provider to return

to the ER for evaluation of a headache.  3 of dementia and unfortunately had a 

ground-level fall 7/12/2021.  He is not anticoagulated.  He presented to the ER 

and underwent CT imaging of the head that did show a small Subarachnoid 

hemorrhage.  The CT of the head was ultimately repeated and it did show that 

perhaps there was a mild increase in the size of the subarachnoid.  Previous 

providers that saw this gentleman in the ER did speak with both neurosurgery at 

[facility] as well as [facility].  Given the small size of this bleed

they would not recommend transfer or neurosurgical intervention.  Ultimately the

patient finally had a third CT of the head that did not show a change in the 

size of the bleed.  He does reside at [facility].  Social work saw the 

patient and they did arrange for increased care hours. Patient's daughter has 

been staying with the patient since he was discharged from the emergency 

department.  Unfortunately there were no beds available to transfer the patient 

to and there were no skilled nursing beds available so she has been staying with

him.  





This morning he reported that he had a bad headache and she did give him Aleve 

which improve the headache.  Patient was seen at Dr. Elder's office today for

neck pain and Dr. Elder felt that he could not address the neck pain until a 

repeat evaluation in the ER ruled out worsening subarachnoid hemorrhage.





His daughter reports that the biggest change for Anjel has been that he now is 

unsteady in his gait.  She found that he needs a wheelchair for long distances 

but he was more stable last night with a walker.





Pt has a POLST and is a DNR





Review of Systems


Unable to obtain: Dementia


Skin: reports: Laceration (s) (Posterior scalp.)


Neurologic: reports: Headache





PD PAST MEDICAL HISTORY





- Past Medical History


Cardiovascular: Congestive heart failure, Hypertension


Neuro: Dementia


: Benign prostate hypertrophy


Musculoskeletal: Osteoarthritis, Gout, Other


Derm: Other





- Past Surgical History


Past Surgical History: Yes


HEENT: Other





- Present Medications


Home Medications: 


                                Ambulatory Orders











 Medication  Instructions  Recorded  Confirmed


 


Amlodipine Besylate 10 mg PO DAILY 01/22/20 07/14/21


 


Furosemide [Lasix] 60 mg PO DAILY 01/22/20 07/14/21


 


Lisinopril [Zestril] 40 mg PO DAILY 01/22/20 07/14/21


 


Potassium Chloride 20 meq PO DAILY 01/22/20 07/14/21


 


allopurinoL [Zyloprim] 1 tab PO DAILY 12/15/20 07/14/21


 


Acetaminophen [Acetaminophen Extra 1 tab PO TID PRN 07/12/21 07/14/21





Strength]   


 


Omega-3/Dha/Epa/Fish Oil [Fish Oil 1 cap PO DAILY 07/12/21 07/14/21





1,000 mg Softgel]   


 


Acetaminophen [Tylenol] 650 mg PO Q6H PRN #30 tab 07/14/21 














- Allergies


Allergies/Adverse Reactions: 


                                    Allergies











Allergy/AdvReac Type Severity Reaction Status Date / Time


 


Sulfa (Sulfonamide Allergy  Unknown Verified 07/14/21 16:11





Antibiotics)     














- Social History


Does the pt smoke?: No


Smoking Status: Never smoker





- Immunizations


Immunizations are current?: No


Immunizations: TDAP >10years/unknown





- POLST


Patient has POLST: No





PD ED PE EXPANDED





- General


General: Other (well appearing)





- Neck


Neck: Soft tissue TTP (neck skewed to the left.  )





- Cardiac


Cardiac: Regular Rate, Radial strong equal, Cap refill < 2 sec





- Respiratory


Respiratory: Clear to ausultation yuan.  No: Distress, Labored





- Abdomen


Abdomen: Normal Bowel sounds.  No: Tender to palpation





- Derm


Derm: Normal color, Warm and dry, Laceration(s) (posterior scalp; well 

approximated)





- Extremities


Extremities: Normal.  No: Deformity, Tenderness





- Neuro


Neuro: Confused, CNII-XII intact





Results





- Vitals


Vitals: 


                               Vital Signs - 24 hr











  07/14/21





  16:12


 


Temperature 36.4 C L


 


Heart Rate 79


 


Respiratory 18





Rate 


 


Blood Pressure 126/82 H


 


O2 Saturation 98








                                     Oxygen











O2 Source                      Room air

















- Labs


Labs: 


                                Laboratory Tests











  07/14/21 07/14/21





  16:21 16:21


 


WBC  9.0 


 


RBC  5.33 


 


Hgb  17.5 


 


Hct  49.9 


 


MCV  93.6 


 


MCH  32.8 H 


 


MCHC  35.1 


 


RDW  14.6 


 


Plt Count  292 


 


MPV  9.3 


 


Neut # (Auto)  6.2 


 


Lymph # (Auto)  1.3 L 


 


Mono # (Auto)  1.1 H 


 


Eos # (Auto)  0.4 


 


Baso # (Auto)  0.1 


 


Absolute Nucleated RBC  0.00 


 


Nucleated RBC %  0.0 


 


Sodium   140


 


Potassium   3.8


 


Chloride   102


 


Carbon Dioxide   27


 


Anion Gap   11.0


 


BUN   24 H


 


Creatinine   1.1


 


Estimated GFR (MDRD)   63 L


 


Glucose   104 H


 


Calcium   10.1


 


Total Bilirubin   2.1 H


 


AST   37


 


ALT   32


 


Alkaline Phosphatase   99


 


Total Protein   7.0


 


Albumin   4.3


 


Globulin   2.7


 


Albumin/Globulin Ratio   1.6


 


Lipase   27














- Rads (name of study)


  ** CT head


Radiology: Final report received (No interval change or progression of the 

previously noted subarachnoid hemorrhage.)





PD MEDICAL DECISION MAKING





- ED course


Complexity details: reviewed results, re-evaluated patient, d/w patient, d/w 

family


ED course: 





88-year-old male return to the ER for evaluation of a headache in the setting of

a known subarachnoid hemorrhage after a fall on the 12th of this month.  Though 

the patient has no focal neuro deficits over the last 2 days he has had 

increased difficulty walking.  He also has some neck pain that may be inhibiting

his ability to walk though per the daughter the neck pain developed before the 

fall and the subarachnoid hemorrhage.





This gentleman is demented and unable to participate in the history but he is 

otherwise well-appearing.  Previously noted scalp laceration is intact without 

secondary findings to suggest infection.  A repeat CT completed today shows no 

interval change in the size of the subarachnoid hemorrhage.  I did repeat 

screening labs for this gentleman and though he is likely mildly dehydrated 

there are no other worrisome findings.  I discussed with the daughter that it is

common to have a headache after subarachnoid hemorrhage and treatment with 

Tylenol is likely to be sufficient at this time.  I advised her against the use 

of NSAID medication such as ibuprofen or naproxen as it does increase the 

breathing risk in cerebral hemorrhage.





Patient was also dispensed a front wheel walker which daughter feels makes him 

more stable with gait.  I did advise that he should always be monitored when 

walking.  Continue follow-up with primary care provider.  Return to the ER for 

any further falls, worsening headache, fevers or focal neuro deficits.





Departure





- Departure


Disposition: 01 Home, Self Care


Clinical Impression: 


 Subarachnoid hemorrhage, Difficulty walking





Subarachnoid hem w/o coma


Qualifiers:


 Encounter type: subsequent encounter Qualified Code(s): S06.6X0D - Traumatic 

subarachnoid hemorrhage without loss of consciousness, subsequent encounter





Condition: Stable


Record reviewed to determine appropriate education?: Yes


Prescriptions: 


Acetaminophen [Tylenol] 650 mg PO Q6H PRN #30 tab


 PRN Reason: Pain


Comments: 


Anjel was seen today in the ER for follow-up of his subarachnoid hemorrhage.  

The repeat CT scan shows no change in the size of the hemorrhage.  This will 

likely take a few weeks to resolve.





It is common for people that have subarachnoid hemorrhages to have headaches.  

It is okay to give him Tylenol as prescribed 4-5 times a day.





He has been prescribed a front wheel walker to help him with ambulation.  He 

should always be supervised when walking and use the walker at all times.





If he has any further falls he should return immediately to the ER for a repeat 

evaluation.

## 2021-07-14 NOTE — CT REPORT
PROCEDURE:  HEAD WO

 

INDICATIONS:  worsening headache; ? change in SAH

 

TECHNIQUE:  

Noncontrast 4.5 mm thick angled axial sections acquired from the foramen magnum to the vertex.  For r
adiation dose reduction, the following was used:  automated exposure control, adjustment of mA and/or
 kV according to patient size.

 

COMPARISON:  7/12/2021, 7/13/2021

 

FINDINGS:  

Image quality:  Excellent.  

 

CSF spaces:  Basal cisterns are patent.  No extra-axial fluid collections.  Ventricles are normal in 
size and shape.  

 

Brain:  There is again seen a small amount of subarachnoid hemorrhage along the medial aspect of the 
left parietal lobe. Compared to the prior CT examination, the volume and configuration of the hemorrh
age has not changed.

 

No midline shift.  No intracranial masses.  Gray-white matter interface is normal. Age-appropriate br
ain parenchymal volume loss and chronic small vessel ischemic change can be seen.    

 

Skull and face:  Calvarium and visualized facial bones are intact, without suspicious lesions.  

 

Sinuses:  Visualized sinuses and mastoids are clear.  

 

 

 

IMPRESSION:  Stable subarachnoid hemorrhage (without interval progression) seen along the medial aspe
ct of the left parietal lobe.

 

Reviewed by: Oscar Holland MD on 7/14/2021 3:48 PM ESTHER

Approved by: Oscar Holland MD on 7/14/2021 3:48 PM AKGALE

 

 

Station ID:  [id number]

## 2021-07-16 ENCOUNTER — HOSPITAL ENCOUNTER (OUTPATIENT)
Dept: HOSPITAL 76 - EMS | Age: 86
End: 2021-07-16
Payer: MEDICARE

## 2021-07-16 DIAGNOSIS — Z03.89: Primary | ICD-10-CM

## 2021-07-18 ENCOUNTER — HOSPITAL ENCOUNTER (OUTPATIENT)
Dept: HOSPITAL 76 - EMS | Age: 86
Discharge: ANOTHER HOSPITAL | End: 2021-07-18
Payer: MEDICARE

## 2021-07-18 DIAGNOSIS — R41.0: Primary | ICD-10-CM

## 2021-07-20 ENCOUNTER — HOSPITAL ENCOUNTER (OUTPATIENT)
Dept: HOSPITAL 76 - EMS | Age: 86
Discharge: HOME | End: 2021-07-20
Attending: NURSE PRACTITIONER
Payer: MEDICARE

## 2021-07-20 DIAGNOSIS — F03.90: ICD-10-CM

## 2021-07-20 DIAGNOSIS — Z74.01: ICD-10-CM

## 2021-07-20 DIAGNOSIS — I62.9: ICD-10-CM

## 2021-07-20 DIAGNOSIS — Z51.5: Primary | ICD-10-CM

## 2021-07-20 DIAGNOSIS — S01.01XA: ICD-10-CM
